# Patient Record
Sex: FEMALE | Race: WHITE | NOT HISPANIC OR LATINO | Employment: UNEMPLOYED | URBAN - METROPOLITAN AREA
[De-identification: names, ages, dates, MRNs, and addresses within clinical notes are randomized per-mention and may not be internally consistent; named-entity substitution may affect disease eponyms.]

---

## 2017-08-30 ENCOUNTER — APPOINTMENT (OUTPATIENT)
Dept: GENERAL RADIOLOGY | Facility: CLINIC | Age: 57
End: 2017-08-30
Attending: EMERGENCY MEDICINE
Payer: COMMERCIAL

## 2017-08-30 ENCOUNTER — HOSPITAL ENCOUNTER (EMERGENCY)
Facility: CLINIC | Age: 57
Discharge: HOME | End: 2017-08-30
Attending: EMERGENCY MEDICINE | Admitting: EMERGENCY MEDICINE
Payer: COMMERCIAL

## 2017-08-30 VITALS
SYSTOLIC BLOOD PRESSURE: 125 MMHG | HEIGHT: 63 IN | TEMPERATURE: 98.8 F | RESPIRATION RATE: 16 BRPM | DIASTOLIC BLOOD PRESSURE: 82 MMHG | HEART RATE: 83 BPM | OXYGEN SATURATION: 92 % | BODY MASS INDEX: 24.45 KG/M2 | WEIGHT: 138 LBS
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
## 2017-08-30 DIAGNOSIS — S99.911A ANKLE INJURY, RIGHT, INITIAL ENCOUNTER: ICD-10-CM

## 2017-08-30 PROCEDURE — 25000128 H RX IP 250 OP 636: Performed by: EMERGENCY MEDICINE

## 2017-08-30 PROCEDURE — 73630 X-RAY EXAM OF FOOT: CPT | Mod: RT

## 2017-08-30 PROCEDURE — 73610 X-RAY EXAM OF ANKLE: CPT | Mod: RT

## 2017-08-30 PROCEDURE — 99284 EMERGENCY DEPT VISIT MOD MDM: CPT

## 2017-08-30 PROCEDURE — 73590 X-RAY EXAM OF LOWER LEG: CPT | Mod: RT

## 2017-08-30 PROCEDURE — 99284 EMERGENCY DEPT VISIT MOD MDM: CPT | Mod: Z6 | Performed by: EMERGENCY MEDICINE

## 2017-08-30 RX ORDER — ONDANSETRON 2 MG/ML
4 INJECTION INTRAMUSCULAR; INTRAVENOUS ONCE
Status: COMPLETED | OUTPATIENT
Start: 2017-08-30 | End: 2017-08-30

## 2017-08-30 RX ORDER — TRAMADOL HYDROCHLORIDE 50 MG/1
25 TABLET ORAL EVERY 8 HOURS PRN
Qty: 6 TABLET | Refills: 0 | Status: SHIPPED | OUTPATIENT
Start: 2017-08-30 | End: 2017-12-28

## 2017-08-30 RX ADMIN — ONDANSETRON 4 MG: 2 INJECTION INTRAMUSCULAR; INTRAVENOUS at 21:52

## 2017-08-30 ASSESSMENT — ENCOUNTER SYMPTOMS
NUMBNESS: 0
WEAKNESS: 0
HEADACHES: 0

## 2017-08-30 NOTE — ED AVS SNAPSHOT
[facility], Emergency Department    [address] [facility] [address]    Phone:  [phone]                                       Akanksha Eric   MRN: [id number]    Department:  [facility], Emergency Department   Date of Visit:  8/30/2017           Patient Information     Date Of Birth          1960        Your diagnoses for this visit were:     Ankle injury, right, initial encounter        You were seen by Fely Liang MD.        Discharge Instructions       FOLLOW-UP:  Please make an appointment to follow up with:  - Your Primary Care Provider within a week  - Orthopedics (phone: [phone])     PRESCRIPTIONS / MEDICATIONS:  - You can use Ibuprofen (600mg up to every 6-8 hours) and/or Tylenol/acetaminophen (1000mg up to every 8 hours) as needed for pain/symptom control.   - For pain that is not controlled by the above medications, you can try the prescribed tramadol.  --- You have been prescribed narcotic pain medication. You should not take this medication and use alcohol or other sedating substances or medications. Do not drive, operate heavy machinery, or engage in potentially dangerous activities while on this medication. This medication can also cause constipation and other adverse reactions. If you develop abnormal symptoms stop taking this medication and contact your medical provider.     OTHER INSTRUCTIONS:  - Use the provided crutches and boot to protect your foot and ankle. Try to not bear weight on this ankle while it is this severely injured, but do remove it from the boot to try to move it around as much as you can tolerate pain-wise.   --- An example of this is you could pretend you're writing the alphabet with your foot, as we discussed.     RETURN TO THE EMERGENCY DEPARTMENT  Return to the Emergency Department at any time for new/worsening symptoms.       Understanding Ankle Sprain    The ankle is the joint where the leg and foot meet. Bones are held in place by  connective tissue called ligaments. When ankle ligaments are stretched to the point of pain and injury, it is called an ankle sprain. A sprain can tear the ligaments. These tears can be very small but still cause pain. Ankle sprains can be mild or severe.  What causes an ankle sprain?  A sprain may occur when you twist your ankle or bend it too far. This can happen when you stumble or fall. Things that can make an ankle sprain more likely include:    Having had an ankle sprain before    Playing sports that involve running and jumping. Or playing contact sports such as football or hockey.    Wearing shoes that don t support your feet and ankles well    Having ankles with poor strength and flexibility  Symptoms of an ankle sprain  Symptoms may include:    Pain or soreness in the ankle    Swelling    Redness or bruising    Not being able to walk or put weight on the affected foot    Reduced range of motion in the ankle    A popping or tearing feeling at the time the sprain occurs    An abnormal or dislocated look to the ankle    Instability or too much range of motion in the ankle  Treatment for an ankle sprain  Treatment focuses on reducing pain and swelling, and avoiding further injury. Treatments may include:    Resting the ankle. Avoid putting weight on it. This may mean using crutches until the sprain heals.    Prescription or over-the-counter pain medicines. These help reduce swelling and pain.    Cold packs. These help reduce pain and swelling.    Raising your ankle above your heart. This helps reduce swelling.    Wrapping the ankle with an elastic bandage or ankle brace. This helps reduce swelling and gives some support to the ankle. In rare cases, you may need a cast or boot.    Stretching and other exercises. These improve flexibility and strength.    Heat packs. These may be recommended before doing ankle exercises.  Possible complications of an ankle sprain  An ankle that has been weakened by a sprain can  be more likely to have repeated sprains afterward. Doing exercises to strengthen your ankle and improve balance can reduce your risk for repeated sprains. Other possible complications are long-term (chronic) pain or an ankle that remains unstable.  Additionally, there could be potential injuries that were not seen on your initial x-rays and this can be evaluated further in the outpatient clinic setting.  When to call your healthcare provider  Call your healthcare provider right away if you have any of these:    Fever of 100.4 F (38 C) or higher, or as directed    Pain, numbness, discoloration, or coldness in the foot or toes    Pain that gets worse    Symptoms that don t get better, or get worse    New symptoms   Date Last Reviewed: 3/10/2016    3810-7504 [employer]. [address] [place] [address], [place] [address]. All rights reserved. This information is not intended as a substitute for professional medical care. Always follow your healthcare professional's instructions.      Treating Ankle Sprains  Treatment will depend on how bad your sprain is. For a severe sprain, healing may take 3 months or more.  Right after your injury: Use R.I.C.E.    Rest: At first, keep weight off the ankle as much as you can. You may be given crutches to help you walk without putting weight on the ankle.    Ice: Put an ice pack on the ankle for 15 minutes. Remove the pack and wait at least 30 minutes. Repeat for up to 3 days. This helps reduce swelling.    Compression: To reduce swelling and keep the joint stable, you may need to wrap the ankle with an elastic bandage. For more severe sprains, you may need an ankle brace or a cast.    Elevation: To reduce swelling, keep your ankle raised above your heart when you sit or lie down.  Medicine  Your healthcare provider may suggest oral non-steroidal anti-inflammatory medicine (NSAIDs), such as ibuprofen. This relieves the pain and helps reduce any swelling. Be sure to take your  medicine as directed.    Date Last Reviewed: 9/28/2015 2000-2017 The [employer]. [address] [place] [address], [place] [address]. All rights reserved. This information is not intended as a substitute for professional medical care. Always follow your healthcare professional's instructions.              24 Hour Appointment Hotline       To make an appointment at any [facility], call [phone] ([phone]). If you don't have a family doctor or clinic, we will help you find one. [facility] are conveniently located to serve the needs of you and your family.          ED Discharge Orders     Shanell Rhoades           ORTHOPEDICS ADULT REFERRAL       Your provider has referred you to: [facility]: Orthopaedic Clinic [facility] [phone]   [email or web address]      Please be aware that coverage of these services is subject to the terms and limitations of your health insurance plan.  Call member services at your health plan with any benefit or coverage questions.      Please bring the following to your appointment:    >>   Any x-rays, CTs or MRIs which have been performed.  Contact the facility where they were done to arrange for  prior to your scheduled appointment.    >>   List of current medications   >>   This referral request   >>   Any documents/labs given to you for this referral                     Review of your medicines      START taking        Dose / Directions Last dose taken    traMADol 50 MG tablet   Commonly known as:  ULTRAM   Dose:  25 mg   Quantity:  6 tablet        Take 0.5 tablets (25 mg) by mouth every 8 hours as needed for severe pain or breakthrough pain   Refills:  0          Our records show that you are taking the medicines listed below. If these are incorrect, please call your family doctor or clinic.        Dose / Directions Last dose taken    EPINEPHrine 0.3 MG/0.3ML injection   Commonly known as:  EPIPEN 2-ALONZO   Dose:  0.3 mg    Quantity:  2 each        Inject 0.3 mLs into the muscle once as needed for anaphylaxis for 1 dose.   Refills:  3        OMEPRAZOLE        as needed.   Refills:  0                Prescriptions were sent or printed at these locations (1 Prescription)                   Other Prescriptions                Printed at Department/Unit printer (1 of 1)         traMADol (ULTRAM) 50 MG tablet                Procedures and tests performed during your visit     Ankle XR, G/E 3 views, right    Foot  XR, G/E 3 views, right    Tib/Fib XR, right      Orders Needing Specimen Collection     None      Pending Results     Date and Time Order Name Status Description    8/30/2017 2107 Tib/Fib XR, right Preliminary     8/30/2017 2107 Foot  XR, G/E 3 views, right Preliminary     8/30/2017 2107 Ankle XR, G/E 3 views, right Preliminary             Pending Culture Results     No orders found from 8/28/2017 to 8/31/2017.            Pending Results Instructions     If you had any lab results that were not finalized at the time of your Discharge, you can call the ED Lab Result RN at [phone]. You will be contacted by this team for any positive Lab results or changes in treatment. The nurses are available 7 days a week from 10A to 6:30P.  You can leave a message 24 hours per day and they will return your call.        Thank you for choosing [facility]       Thank you for choosing [facility] for your care. Our goal is always to provide you with excellent care. Hearing back from our patients is one way we can continue to improve our services. Please take a few minutes to complete the written survey that you may receive in the mail after you visit with us. Thank you!        [employer] Information     [employer] gives you secure access to your electronic health record. If you see a primary care provider, you can also send messages to your care team and make appointments. If you have questions, please call your primary care clinic.  If you do not have a  primary care provider, please call [phone] and they will assist you.        Care EveryWhere ID     This is your Care EveryWhere ID. This could be used by other organizations to access your [facility] medical records  [id number]        Equal Access to Services     YRN SINGH : Maria T Thurston, jas mcclendon, gisel kaalniall florez, malvin cadet. So [facility] [phone].    ATENCIÓN: Si habla español, tiene a chery disposición servicios gratuitos de asistencia lingüística. Llame al [phone].    We comply with applicable federal civil rights laws and [place] laws. We do not discriminate on the basis of race, color, national origin, age, disability sex, sexual orientation or gender identity.            After Visit Summary       This is your record. Keep this with you and show to your community pharmacist(s) and doctor(s) at your next visit.

## 2017-08-30 NOTE — ED AVS SNAPSHOT
[facility] [place], Emergency Department    [address] [place] [id number]    Phone:  [phone]                                       Akanksha Eric   MRN: [id number]    Department:  [facility], Emergency Department   Date of Visit:  8/30/2017           After Visit Summary Signature Page     I have received my discharge instructions, and my questions have been answered. I have discussed any challenges I see with this plan with the nurse or doctor.    ..........................................................................................................................................  Patient/Patient Representative Signature      ..........................................................................................................................................  Patient Representative Print Name and Relationship to Patient    ..................................................               ................................................  Date                                            Time    ..........................................................................................................................................  Reviewed by Signature/Title    ...................................................              ..............................................  Date                                                            Time
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
## 2017-08-31 NOTE — DISCHARGE INSTRUCTIONS
FOLLOW-UP:  Please make an appointment to follow up with:  - Your Primary Care Provider within a week  - Orthopedics (phone: [phone])     PRESCRIPTIONS / MEDICATIONS:  - You can use Ibuprofen (600mg up to every 6-8 hours) and/or Tylenol/acetaminophen (1000mg up to every 8 hours) as needed for pain/symptom control.   - For pain that is not controlled by the above medications, you can try the prescribed tramadol.  --- You have been prescribed narcotic pain medication. You should not take this medication and use alcohol or other sedating substances or medications. Do not drive, operate heavy machinery, or engage in potentially dangerous activities while on this medication. This medication can also cause constipation and other adverse reactions. If you develop abnormal symptoms stop taking this medication and contact your medical provider.     OTHER INSTRUCTIONS:  - Use the provided crutches and boot to protect your foot and ankle. Try to not bear weight on this ankle while it is this severely injured, but do remove it from the boot to try to move it around as much as you can tolerate pain-wise.   --- An example of this is you could pretend you're writing the alphabet with your foot, as we discussed.     RETURN TO THE EMERGENCY DEPARTMENT  Return to the Emergency Department at any time for new/worsening symptoms.       Understanding Ankle Sprain    The ankle is the joint where the leg and foot meet. Bones are held in place by connective tissue called ligaments. When ankle ligaments are stretched to the point of pain and injury, it is called an ankle sprain. A sprain can tear the ligaments. These tears can be very small but still cause pain. Ankle sprains can be mild or severe.  What causes an ankle sprain?  A sprain may occur when you twist your ankle or bend it too far. This can happen when you stumble or fall. Things that can make an ankle sprain more likely include:    Having had an ankle sprain  before    Playing sports that involve running and jumping. Or playing contact sports such as football or hockey.    Wearing shoes that don t support your feet and ankles well    Having ankles with poor strength and flexibility  Symptoms of an ankle sprain  Symptoms may include:    Pain or soreness in the ankle    Swelling    Redness or bruising    Not being able to walk or put weight on the affected foot    Reduced range of motion in the ankle    A popping or tearing feeling at the time the sprain occurs    An abnormal or dislocated look to the ankle    Instability or too much range of motion in the ankle  Treatment for an ankle sprain  Treatment focuses on reducing pain and swelling, and avoiding further injury. Treatments may include:    Resting the ankle. Avoid putting weight on it. This may mean using crutches until the sprain heals.    Prescription or over-the-counter pain medicines. These help reduce swelling and pain.    Cold packs. These help reduce pain and swelling.    Raising your ankle above your heart. This helps reduce swelling.    Wrapping the ankle with an elastic bandage or ankle brace. This helps reduce swelling and gives some support to the ankle. In rare cases, you may need a cast or boot.    Stretching and other exercises. These improve flexibility and strength.    Heat packs. These may be recommended before doing ankle exercises.  Possible complications of an ankle sprain  An ankle that has been weakened by a sprain can be more likely to have repeated sprains afterward. Doing exercises to strengthen your ankle and improve balance can reduce your risk for repeated sprains. Other possible complications are long-term (chronic) pain or an ankle that remains unstable.  Additionally, there could be potential injuries that were not seen on your initial x-rays and this can be evaluated further in the outpatient clinic setting.  When to call your healthcare provider  Call your healthcare provider right  away if you have any of these:    Fever of 100.4 F (38 C) or higher, or as directed    Pain, numbness, discoloration, or coldness in the foot or toes    Pain that gets worse    Symptoms that don t get better, or get worse    New symptoms   Date Last Reviewed: 3/10/2016    5079-4581 [employer]. [address] [place] [address]. All rights reserved. This information is not intended as a substitute for professional medical care. Always follow your healthcare professional's instructions.      Treating Ankle Sprains  Treatment will depend on how bad your sprain is. For a severe sprain, healing may take 3 months or more.  Right after your injury: Use R.I.C.E.    Rest: At first, keep weight off the ankle as much as you can. You may be given crutches to help you walk without putting weight on the ankle.    Ice: Put an ice pack on the ankle for 15 minutes. Remove the pack and wait at least 30 minutes. Repeat for up to 3 days. This helps reduce swelling.    Compression: To reduce swelling and keep the joint stable, you may need to wrap the ankle with an elastic bandage. For more severe sprains, you may need an ankle brace or a cast.    Elevation: To reduce swelling, keep your ankle raised above your heart when you sit or lie down.  Medicine  Your healthcare provider may suggest oral non-steroidal anti-inflammatory medicine (NSAIDs), such as ibuprofen. This relieves the pain and helps reduce any swelling. Be sure to take your medicine as directed.    Date Last Reviewed: 9/28/2015 2000-2017 [employer]. [address] [place] [address]. All rights reserved. This information is not intended as a substitute for professional medical care. Always follow your healthcare professional's instructions.

## 2017-09-01 ASSESSMENT — ENCOUNTER SYMPTOMS
COLOR CHANGE: 1
NECK PAIN: 0
NAUSEA: 1
WOUND: 0
JOINT SWELLING: 1
BRUISES/BLEEDS EASILY: 0
SEIZURES: 0
BACK PAIN: 0
VOMITING: 0
ARTHRALGIAS: 1

## 2017-12-28 ENCOUNTER — OFFICE VISIT (OUTPATIENT)
Dept: FAMILY MEDICINE | Facility: CLINIC | Age: 57
End: 2017-12-28
Payer: COMMERCIAL

## 2017-12-28 VITALS
TEMPERATURE: 99.8 F | OXYGEN SATURATION: 97 % | WEIGHT: 147 LBS | RESPIRATION RATE: 18 BRPM | BODY MASS INDEX: 26.04 KG/M2 | SYSTOLIC BLOOD PRESSURE: 138 MMHG | DIASTOLIC BLOOD PRESSURE: 83 MMHG | HEART RATE: 73 BPM

## 2017-12-28 DIAGNOSIS — J20.9 ACUTE BRONCHITIS WITH SYMPTOMS > 10 DAYS: Primary | ICD-10-CM

## 2017-12-28 PROCEDURE — 99202 OFFICE O/P NEW SF 15 MIN: CPT | Performed by: FAMILY MEDICINE

## 2017-12-28 RX ORDER — AZITHROMYCIN 250 MG/1
TABLET, FILM COATED ORAL
Qty: 6 TABLET | Refills: 0 | Status: SHIPPED | OUTPATIENT
Start: 2017-12-28 | End: 2018-01-11

## 2017-12-28 NOTE — MR AVS SNAPSHOT
After Visit Summary   12/28/2017    Akanksha Eric    MRN: [id number]           Patient Information     Date Of Birth          1960        Visit Information        Provider Department      12/28/2017 2:00 PM Devan Lomas MD [facility]        Today's Diagnoses     Acute bronchitis with symptoms > 10 days    -  1       Follow-ups after your visit        Your next 10 appointments already scheduled     Dec 29, 2017 11:20 AM CST   Office Visit with Janine Carr MD   [facility] ([facility])    [address] [place] [id number] [phone]           Bring a current list of meds and any records pertaining to this visit. For Physicals, please bring immunization records and any forms needing to be filled out. Please arrive 10 minutes early to complete paperwork.              Who to contact     If you have questions or need follow up information about today's clinic visit or your schedule please contact [facility] directly at [phone].  Normal or non-critical lab and imaging results will be communicated to you by Hedvighart, letter or phone within 4 business days after the clinic has received the results. If you do not hear from us within 7 days, please contact the clinic through Hedvighart or phone. If you have a critical or abnormal lab result, we will notify you by phone as soon as possible.  Submit refill requests through CleanApp or call your pharmacy and they will forward the refill request to us. Please allow 3 business days for your refill to be completed.          Additional Information About Your Visit        MyChart Information     CleanApp gives you secure access to your electronic health record. If you see a primary care provider, you can also send messages to your care team and make appointments. If you have questions, please call your primary care clinic.  If you do not have a  primary care provider, please call [phone] and they will assist you.        Care EveryWhere ID     This is your Care EveryWhere ID. This could be used by other organizations to access your [facility] medical records  [id number]        Your Vitals Were     Pulse Temperature Respirations Last Period Pulse Oximetry BMI (Body Mass Index)    73 99.8  F (37.7  C) (Tympanic) 18 08/25/2003 97% 26.04 kg/m2       Blood Pressure from Last 3 Encounters:   12/28/17 138/83   08/30/17 125/82   07/24/15 129/85    Weight from Last 3 Encounters:   12/28/17 147 lb (66.7 kg)   08/30/17 138 lb (62.6 kg)   07/24/15 141 lb 3.2 oz (64 kg)              Today, you had the following     No orders found for display         Today's Medication Changes          These changes are accurate as of: 12/28/17  3:33 PM.  If you have any questions, ask your nurse or doctor.               Start taking these medicines.        Dose/Directions    azithromycin 250 MG tablet   Commonly known as:  ZITHROMAX   Started by:  Devan Lomas MD        2 TABLETS TODAY, THEN 1 TABLET DAILY X NEXT 4 DAYS   Quantity:  6 tablet   Refills:  0         Stop taking these medicines if you haven't already. Please contact your care team if you have questions.     traMADol 50 MG tablet   Commonly known as:  ULTRAM   Stopped by:  Devan Lomas MD                Where to get your medicines      These medications were sent to [facility] - [place], [place] - [address]  [address], [place] [address]     Phone:  [phone]     azithromycin 250 MG tablet                Primary Care Provider Fax #    Physician No Ref-Primary [phone]       No address on file        Equal Access to Services     [facility] : Maria T Thurston, jas lukim, qaybta kaalmada vikki, malvin cadet. So [facility] [phone].    ATENCIÓN: Si habla español, tiene a chery disposición servicios gratuitos de asistencia  lingüística. [place]ne al [phone].    We comply with applicable federal civil rights laws and [place] laws. We do not discriminate on the basis of race, color, national origin, age, disability, sex, sexual orientation, or gender identity.            Thank you!     Thank you for choosing [facility]  for your care. Our goal is always to provide you with excellent care. Hearing back from our patients is one way we can continue to improve our services. Please take a few minutes to complete the written survey that you may receive in the mail after your visit with us. Thank you!             Your Updated Medication List - Protect others around you: Learn how to safely use, store and throw away your medicines at www.disposemymeds.org.          This list is accurate as of: 12/28/17  3:33 PM.  Always use your most recent med list.                   Brand Name Dispense Instructions for use Diagnosis    azithromycin 250 MG tablet    ZITHROMAX    6 tablet    2 TABLETS TODAY, THEN 1 TABLET DAILY X NEXT 4 DAYS        EPINEPHrine 0.3 MG/0.3ML injection    EPIPEN 2-ALONZO    2 each    Inject 0.3 mLs into the muscle once as needed for anaphylaxis for 1 dose.        OMEPRAZOLE      as needed.

## 2017-12-28 NOTE — NURSING NOTE
"Chief Complaint   Patient presents with     URI     cough       Initial /83 (BP Location: Left arm, Patient Position: Sitting, Cuff Size: Adult Regular)  Pulse 73  Temp 99.8  F (37.7  C) (Tympanic)  Resp 18  Wt 147 lb (66.7 kg)  LMP 08/25/2003  SpO2 97%  BMI 26.04 kg/m2 Estimated body mass index is 26.04 kg/(m^2) as calculated from the following:    Height as of 8/30/17: 5' 3\" (1.6 m).    Weight as of this encounter: 147 lb (66.7 kg).  Medication Reconciliation: complete     Luis Miguel Bustos MA      "

## 2017-12-28 NOTE — PROGRESS NOTES
SUBJECTIVE:   Akanksha Eric is a 57 year old female who presents to clinic today for the following health issues:    RESPIRATORY SYMPTOMS      Duration: few weeks    Description  Cough with phlegm and chest is full. Ache in the shoulder and chest for coughing. Slight fever with body chills couple of days ago.    Severity: modern    History (predisposing factors):  none    Precipitating or alleviating factors: Pt is under a lot of stress.    Therapies tried and outcome:  Advil for pain in the shoulder and mid back    No shortness of breath. Not much for nasal congestion.     No recent medical care. Concerned about cancer given her brother and sister.     No particular exposure. No asthma.     OBJECTIVE: /83 (BP Location: Left arm, Patient Position: Sitting, Cuff Size: Adult Regular)  Pulse 73  Temp 99.8  F (37.7  C) (Tympanic)  Resp 18  Wt 147 lb (66.7 kg)  LMP 08/25/2003  SpO2 97%  BMI 26.04 kg/m2 Exam:  GENERAL APPEARANCE: healthy, alert and no distress  HENT: ear canals and TM's normal and nose and mouth without ulcers or lesions  NECK: no adenopathy, no asymmetry, masses, or scars and thyroid normal to palpation  RESP: lungs clear to auscultation - no rales, rhonchi or wheezes  CV: regular rates and rhythm, normal S1 S2, no S3 or S4 and no murmur, click or rub -      ICD-10-CM    1. Acute bronchitis with symptoms > 10 days J20.9     possibly bacterial given duration. Symptomatic therapy and follow up discussed

## 2018-01-11 ENCOUNTER — OFFICE VISIT (OUTPATIENT)
Dept: FAMILY MEDICINE | Facility: CLINIC | Age: 58
End: 2018-01-11
Payer: COMMERCIAL

## 2018-01-11 VITALS
WEIGHT: 147 LBS | HEART RATE: 68 BPM | DIASTOLIC BLOOD PRESSURE: 90 MMHG | OXYGEN SATURATION: 95 % | BODY MASS INDEX: 26.05 KG/M2 | RESPIRATION RATE: 16 BRPM | HEIGHT: 63 IN | SYSTOLIC BLOOD PRESSURE: 133 MMHG | TEMPERATURE: 97.9 F

## 2018-01-11 DIAGNOSIS — Z00.00 ROUTINE GENERAL MEDICAL EXAMINATION AT A HEALTH CARE FACILITY: Primary | ICD-10-CM

## 2018-01-11 DIAGNOSIS — Z12.31 ENCOUNTER FOR SCREENING MAMMOGRAM FOR BREAST CANCER: ICD-10-CM

## 2018-01-11 LAB
BASOPHILS # BLD AUTO: 0 10E9/L (ref 0–0.2)
BASOPHILS NFR BLD AUTO: 0.4 %
DIFFERENTIAL METHOD BLD: NORMAL
EOSINOPHIL # BLD AUTO: 0.2 10E9/L (ref 0–0.7)
EOSINOPHIL NFR BLD AUTO: 3.7 %
ERYTHROCYTE [DISTWIDTH] IN BLOOD BY AUTOMATED COUNT: 11.9 % (ref 10–15)
HCT VFR BLD AUTO: 42 % (ref 35–47)
HGB BLD-MCNC: 13.4 G/DL (ref 11.7–15.7)
LYMPHOCYTES # BLD AUTO: 1.9 10E9/L (ref 0.8–5.3)
LYMPHOCYTES NFR BLD AUTO: 35 %
MCH RBC QN AUTO: 31.5 PG (ref 26.5–33)
MCHC RBC AUTO-ENTMCNC: 31.9 G/DL (ref 31.5–36.5)
MCV RBC AUTO: 99 FL (ref 78–100)
MONOCYTES # BLD AUTO: 0.6 10E9/L (ref 0–1.3)
MONOCYTES NFR BLD AUTO: 12 %
NEUTROPHILS # BLD AUTO: 2.6 10E9/L (ref 1.6–8.3)
NEUTROPHILS NFR BLD AUTO: 48.9 %
PLATELET # BLD AUTO: 292 10E9/L (ref 150–450)
RBC # BLD AUTO: 4.26 10E12/L (ref 3.8–5.2)
WBC # BLD AUTO: 5.4 10E9/L (ref 4–11)

## 2018-01-11 PROCEDURE — 85025 COMPLETE CBC W/AUTO DIFF WBC: CPT | Performed by: FAMILY MEDICINE

## 2018-01-11 PROCEDURE — 36415 COLL VENOUS BLD VENIPUNCTURE: CPT | Performed by: FAMILY MEDICINE

## 2018-01-11 PROCEDURE — 80061 LIPID PANEL: CPT | Performed by: FAMILY MEDICINE

## 2018-01-11 PROCEDURE — 80053 COMPREHEN METABOLIC PANEL: CPT | Performed by: FAMILY MEDICINE

## 2018-01-11 PROCEDURE — 99396 PREV VISIT EST AGE 40-64: CPT | Performed by: FAMILY MEDICINE
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
## 2018-01-11 NOTE — MR AVS SNAPSHOT
After Visit Summary   1/11/2018    Akanksha Eric    MRN: [id number]           Patient Information     Date Of Birth          1960        Visit Information        Provider Department      1/11/2018 10:00 AM Janine Carr MD [facility]        Today's Diagnoses     Routine general medical examination at a health care facility    -  1    Encounter for screening mammogram for breast cancer          Care Instructions      Preventive Health Recommendations  Female Ages 50 - 64    Yearly exam: See your health care provider every year in order to  o Review health changes.   o Discuss preventive care.    o Review your medicines if your doctor has prescribed any.      Get a Pap test every three years (unless you have an abnormal result and your provider advises testing more often).    If you get Pap tests with HPV test, you only need to test every 5 years, unless you have an abnormal result.     You do not need a Pap test if your uterus was removed (hysterectomy) and you have not had cancer.    You should be tested each year for STDs (sexually transmitted diseases) if you're at risk.     Have a mammogram every 1 to 2 years.    Have a colonoscopy at age 50, or have a yearly FIT test (stool test). These exams screen for colon cancer.      Have a cholesterol test every 5 years, or more often if advised.    Have a diabetes test (fasting glucose) every three years. If you are at risk for diabetes, you should have this test more often.     If you are at risk for osteoporosis (brittle bone disease), think about having a bone density scan (DEXA).    Shots: Get a flu shot each year. Get a tetanus shot every 10 years.    Nutrition:     Eat at least 5 servings of fruits and vegetables each day.    Eat whole-grain bread, whole-wheat pasta and brown rice instead of white grains and rice.    Talk to your provider about Calcium and Vitamin D.     Lifestyle    Exercise at least 150  minutes a week (30 minutes a day, 5 days a week). This will help you control your weight and prevent disease.    Limit alcohol to one drink per day.    No smoking.     Wear sunscreen to prevent skin cancer.     See your dentist every six months for an exam and cleaning.    See your eye doctor every 1 to 2 years.            Follow-ups after your visit        Future tests that were ordered for you today     Open Future Orders        Priority Expected Expires Ordered    *MA Screening Digital Bilateral Routine  1/11/2019 1/11/2018            Who to contact     If you have questions or need follow up information about today's clinic visit or your schedule please contact [facility] directly at [phone].  Normal or non-critical lab and imaging results will be communicated to you by [employer], letter or phone within 4 business days after the clinic has received the results. If you do not hear from us within 7 days, please contact the clinic through [employer] or phone. If you have a critical or abnormal lab result, we will notify you by phone as soon as possible.  Submit refill requests through [employer] or call your pharmacy and they will forward the refill request to us. Please allow 3 business days for your refill to be completed.          Additional Information About Your Visit        [employer] Information     [employer] gives you secure access to your electronic health record. If you see a primary care provider, you can also send messages to your care team and make appointments. If you have questions, please call your primary care clinic.  If you do not have a primary care provider, please call [phone] and they will assist you.        Care EveryWhere ID     This is your Care EveryWhere ID. This could be used by other organizations to access your [place] medical records  [id number]        Your Vitals Were     Pulse Temperature Respirations Height Last Period Pulse Oximetry    68 97.9  F (36.6  C)  "(Tympanic) 16 5' 3\" (1.6 m) 08/25/2003 95%    Breastfeeding? BMI (Body Mass Index)                No 26.04 kg/m2           Blood Pressure from Last 3 Encounters:   01/11/18 133/90   12/28/17 138/83   08/30/17 125/82    Weight from Last 3 Encounters:   01/11/18 147 lb (66.7 kg)   12/28/17 147 lb (66.7 kg)   08/30/17 138 lb (62.6 kg)              We Performed the Following     CBC with platelets differential     Comprehensive metabolic panel     Lipid panel reflex to direct LDL Fasting        Primary Care Provider Fax #    Physician No Ref-Primary [phone]       No address on file        Equal Access to Services     YRN SINGH : Maria T Thurston, jas mcclendon, gisel florez, malvin broderick . So [facility] [phone].    ATENCIÓN: Si habla español, tiene a chery disposición servicios gratuitos de asistencia lingüística. Llame al [phone].    We comply with applicable federal civil rights laws and [place] laws. We do not discriminate on the basis of race, color, national origin, age, disability, sex, sexual orientation, or gender identity.            Thank you!     Thank you for choosing [facility]  for your care. Our goal is always to provide you with excellent care. Hearing back from our patients is one way we can continue to improve our services. Please take a few minutes to complete the written survey that you may receive in the mail after your visit with us. Thank you!             Your Updated Medication List - Protect others around you: Learn how to safely use, store and throw away your medicines at www.disposemymeds.org.          This list is accurate as of: 1/11/18 10:52 AM.  Always use your most recent med list.                   Brand Name Dispense Instructions for use Diagnosis    EPINEPHrine 0.3 MG/0.3ML injection    EPIPEN 2-ALONZO    2 each    Inject 0.3 mLs into the muscle once as needed for anaphylaxis for 1 dose.        " OMEPRAZOLE      as needed.

## 2018-01-12 LAB
ALBUMIN SERPL-MCNC: 4.2 G/DL (ref 3.4–5)
ALP SERPL-CCNC: 85 U/L (ref 40–150)
ALT SERPL W P-5'-P-CCNC: 21 U/L (ref 0–50)
ANION GAP SERPL CALCULATED.3IONS-SCNC: 10 MMOL/L (ref 3–14)
AST SERPL W P-5'-P-CCNC: 19 U/L (ref 0–45)
BILIRUB SERPL-MCNC: 0.6 MG/DL (ref 0.2–1.3)
BUN SERPL-MCNC: 10 MG/DL (ref 7–30)
CALCIUM SERPL-MCNC: 9.6 MG/DL (ref 8.5–10.1)
CHLORIDE SERPL-SCNC: 109 MMOL/L (ref 94–109)
CHOLEST SERPL-MCNC: 254 MG/DL
CO2 SERPL-SCNC: 25 MMOL/L (ref 20–32)
CREAT SERPL-MCNC: 0.68 MG/DL (ref 0.52–1.04)
GFR SERPL CREATININE-BSD FRML MDRD: 88 ML/MIN/1.7M2
GLUCOSE SERPL-MCNC: 104 MG/DL (ref 70–99)
HDLC SERPL-MCNC: 115 MG/DL
LDLC SERPL CALC-MCNC: 125 MG/DL
NONHDLC SERPL-MCNC: 139 MG/DL
POTASSIUM SERPL-SCNC: 5.1 MMOL/L (ref 3.4–5.3)
PROT SERPL-MCNC: 7.7 G/DL (ref 6.8–8.8)
SODIUM SERPL-SCNC: 144 MMOL/L (ref 133–144)
TRIGL SERPL-MCNC: 68 MG/DL

## 2018-01-18 PROBLEM — R73.01 IMPAIRED FASTING GLUCOSE: Status: ACTIVE | Noted: 2018-01-18

## 2018-07-10 ENCOUNTER — TELEPHONE (OUTPATIENT)
Dept: FAMILY MEDICINE | Facility: CLINIC | Age: 58
End: 2018-07-10

## 2018-07-10 NOTE — TELEPHONE ENCOUNTER
7/10/2018    Attempt 1    Contacted patient in regards to scheduling VIP mammogram  Message on voicemail     Patient is also due for - Preventive Health Screening Colonoscopy    Comments:       Outreach   Phuong CARPENTER

## 2018-07-14 NOTE — TELEPHONE ENCOUNTER
7/14/2018    Attempt 2    Contacted patient in regards to scheduling VIP mammogram  Message on voicemail     Patient is also due for - Preventive Health Screening Colonoscopy    Comments:       Outreach   ka

## 2018-07-18 NOTE — TELEPHONE ENCOUNTER
7/18/2018    Attempt 3    Contacted patient in regards to scheduling VIP mammogram  Message on voicemail     Patient is also due for - Preventive Health Screening Colonoscopy    Comments:       Outreach   Yadira Tanner

## 2018-09-29 ENCOUNTER — HEALTH MAINTENANCE LETTER (OUTPATIENT)
Age: 58
End: 2018-09-29

## 2019-04-10 ENCOUNTER — OFFICE VISIT (OUTPATIENT)
Dept: FAMILY MEDICINE | Facility: CLINIC | Age: 59
End: 2019-04-10
Payer: COMMERCIAL

## 2019-04-10 VITALS
DIASTOLIC BLOOD PRESSURE: 70 MMHG | OXYGEN SATURATION: 97 % | RESPIRATION RATE: 12 BRPM | TEMPERATURE: 96.9 F | WEIGHT: 150 LBS | HEIGHT: 63 IN | SYSTOLIC BLOOD PRESSURE: 120 MMHG | BODY MASS INDEX: 26.58 KG/M2 | HEART RATE: 63 BPM

## 2019-04-10 DIAGNOSIS — R30.0 DYSURIA: ICD-10-CM

## 2019-04-10 DIAGNOSIS — J01.00 ACUTE NON-RECURRENT MAXILLARY SINUSITIS: Primary | ICD-10-CM

## 2019-04-10 LAB
ALBUMIN UR-MCNC: NEGATIVE MG/DL
APPEARANCE UR: CLEAR
BILIRUB UR QL STRIP: NEGATIVE
COLOR UR AUTO: YELLOW
GLUCOSE UR STRIP-MCNC: NEGATIVE MG/DL
HGB UR QL STRIP: NEGATIVE
KETONES UR STRIP-MCNC: NEGATIVE MG/DL
LEUKOCYTE ESTERASE UR QL STRIP: ABNORMAL
NITRATE UR QL: NEGATIVE
NON-SQ EPI CELLS #/AREA URNS LPF: NORMAL /LPF
PH UR STRIP: 7.5 PH (ref 5–7)
RBC #/AREA URNS AUTO: NORMAL /HPF
SOURCE: ABNORMAL
SP GR UR STRIP: 1.01 (ref 1–1.03)
UROBILINOGEN UR STRIP-ACNC: 0.2 EU/DL (ref 0.2–1)
WBC #/AREA URNS AUTO: NORMAL /HPF

## 2019-04-10 PROCEDURE — 81001 URINALYSIS AUTO W/SCOPE: CPT | Performed by: PHYSICIAN ASSISTANT

## 2019-04-10 PROCEDURE — 99214 OFFICE O/P EST MOD 30 MIN: CPT | Performed by: PHYSICIAN ASSISTANT

## 2019-04-10 ASSESSMENT — PATIENT HEALTH QUESTIONNAIRE - PHQ9
SUM OF ALL RESPONSES TO PHQ QUESTIONS 1-9: 5
5. POOR APPETITE OR OVEREATING: NOT AT ALL

## 2019-04-10 ASSESSMENT — ANXIETY QUESTIONNAIRES
7. FEELING AFRAID AS IF SOMETHING AWFUL MIGHT HAPPEN: SEVERAL DAYS
1. FEELING NERVOUS, ANXIOUS, OR ON EDGE: SEVERAL DAYS
5. BEING SO RESTLESS THAT IT IS HARD TO SIT STILL: SEVERAL DAYS
6. BECOMING EASILY ANNOYED OR IRRITABLE: SEVERAL DAYS
3. WORRYING TOO MUCH ABOUT DIFFERENT THINGS: SEVERAL DAYS

## 2019-04-10 ASSESSMENT — MIFFLIN-ST. JEOR: SCORE: 1219.53

## 2019-04-10 NOTE — NURSING NOTE
"Chief Complaint   Patient presents with     URI     UTI     /70   Pulse 63   Temp 96.9  F (36.1  C) (Tympanic)   Resp 12   Ht 1.6 m (5' 3\")   Wt 68 kg (150 lb)   LMP 08/25/2003 (LMP Unknown)   SpO2 97%   Breastfeeding? No   BMI 26.57 kg/m   Estimated body mass index is 26.57 kg/m  as calculated from the following:    Height as of this encounter: 1.6 m (5' 3\").    Weight as of this encounter: 68 kg (150 lb).  BP completed using cuff size: shefali Hurtado CMA    Health Maintenance Due   Topic Date Due     HIV SCREEN (SYSTEM ASSIGNED)  04/14/1976     HEPATITIS C SCREENING  04/14/1976     DTAP/TDAP/TD IMMUNIZATION (1 - Tdap) 04/14/1983     COLON CANCER SCREEN (SYSTEM ASSIGNED)  04/14/2008     ZOSTER IMMUNIZATION (1 of 2) 04/14/2008     ADVANCE DIRECTIVE PLANNING Q5 YRS  04/14/2013     MAMMO SCREEN Q2 YR (SYSTEM ASSIGNED)  11/28/2014     INFLUENZA VACCINE (1) 09/01/2018     PREVENTIVE CARE VISIT  01/11/2019     LIPID MONITORING Q1 YEAR  01/11/2019     PHQ-2 Q1 YR  01/11/2019     Health Maintenance reviewed at today's visit patient asked to schedule/complete:   Breast Cancer:  Patient agrees to schedule  Colon Cancer:  Patient agrees to schedule  Immunizations:  Patient agrees to schedule    "

## 2019-08-12 ENCOUNTER — TELEPHONE (OUTPATIENT)
Dept: FAMILY MEDICINE | Facility: CLINIC | Age: 59
End: 2019-08-12

## 2019-08-12 NOTE — LETTER
August 12, 2019    Akanksha Eric  [address]  [facility] [address]    Dear Jeoy Vale cares about your health and your health plan.  I have reviewed your medical conditions, medication list and lab results, and am making recommendations based on this review to better manage your health.    You are in particular need of attention regarding:  -Breast Cancer Screening  -Colon Cancer Screening  -Wellness (Physical) Visit     I am recommending that you:     -schedule a WELLNESS (Physical) APPOINTMENT with me.   I will check fasting labs the same day - nothing to eat except water and meds for 8-10 hours prior. (If you go elsewhere for Wellness visits then please disregard this reminder.)    -schedule a MAMMOGRAM which is due.    1 in 8 women will develop invasive breast cancer during her lifetime and it is the most common non-skin cancer in American women.  EARLY detection, new treatments, and a better understanding of the disease have increased survival rates - the 5 year survival rate in the 1960s was 63% and today it is close to 90%.    If you are under/uninsured, we recommend you contact the [facility]. They offer mammograms at no charge or on a sliding fee charge. You can schedule with them at [phone]. Please have them send us the results.      Please disregard this reminder if you have had this exam elsewhere within the last year.  It would be helpful for us to have a copy of your mammogram report in your file so that we can best coordinate your care - please contact us with when your test was done so we can update your record.             -schedule a COLONOSCOPY to look for colon cancer (due every 10 years or 5 years in higher risk situations.)        Colon cancer is now the second leading cause of cancer-related deaths in the [place] for both men and women and there are over 130,000 new cases and 50,000 deaths per year from colon cancer.  Colonoscopies can prevent 90-95% of these  deaths.  Problem lesions can be removed before they ever become cancer.  This test is not only looking for cancer, but also getting rid of precancerious lesions.    If you are under/uninsured, we recommend you contact the [facility] program. [facility] is a free colorectal cancer screening program that provides colonoscopies for eligible under/uninsured [place] men and women. If you are interested in receiving a free colonoscopy, please call [facility] at [phone] (mention code [employer]) to see if you re eligible.      If you do not wish to do a colonoscopy or cannot afford to do one, at this time, there is another option. It is called a FIT test or Fecal Immunochemical Occult Blood Test (take home stool sample kit).  It does not replace the colonoscopy for colorectal cancer screening, but it can detect hidden bleeding in the lower colon.  It does need to be repeated every year and if a positive result is obtained, you would be referred for a colonoscopy.          If you have completed either one of these tests at another facility, please call with the details of when and where the tests were done and if they were normal or not. Or have the records sent to our clinic so that we can best coordinate your care.      Please call us at the [place] location:  [phone] or use nCino to address the above recommendations.     Thank you for trusting [facility].  We appreciate the opportunity to serve you and look forward to supporting your healthcare in the future.    If you have (or plan to have) any of these tests done at a facility other than a [facility] or a [facility], please have the results sent to the [facility] location noted above.      Best Regards,    SOHAN Cunningham

## 2019-08-12 NOTE — TELEPHONE ENCOUNTER
Panel Management Review      Patient has the following on her problem list: None      Composite cancer screening  Chart review shows that this patient is due/due soon for the following Mammogram and Colonoscopy  Summary:    Patient is due/failing the following:   COLONOSCOPY, MAMMOGRAM and PHYSICAL    Action needed:   Patient needs office visit for physical. and Patient needs referral/order: mammo and colonoscopy    Type of outreach:    Sent letter.    Questions for provider review:    None

## 2019-10-02 ENCOUNTER — HEALTH MAINTENANCE LETTER (OUTPATIENT)
Age: 59
End: 2019-10-02

## 2021-01-15 ENCOUNTER — HEALTH MAINTENANCE LETTER (OUTPATIENT)
Age: 61
End: 2021-01-15

## 2021-09-04 ENCOUNTER — HEALTH MAINTENANCE LETTER (OUTPATIENT)
Age: 61
End: 2021-09-04

## 2021-10-30 ENCOUNTER — HEALTH MAINTENANCE LETTER (OUTPATIENT)
Age: 61
End: 2021-10-30

## 2022-02-19 ENCOUNTER — HEALTH MAINTENANCE LETTER (OUTPATIENT)
Age: 62
End: 2022-02-19

## 2022-07-24 ENCOUNTER — OFFICE VISIT (OUTPATIENT)
Dept: URGENT CARE | Facility: URGENT CARE | Age: 62
End: 2022-07-24
Payer: COMMERCIAL

## 2022-07-24 VITALS
HEART RATE: 75 BPM | WEIGHT: 135 LBS | BODY MASS INDEX: 23.91 KG/M2 | TEMPERATURE: 98.1 F | SYSTOLIC BLOOD PRESSURE: 144 MMHG | DIASTOLIC BLOOD PRESSURE: 82 MMHG | OXYGEN SATURATION: 99 % | RESPIRATION RATE: 20 BRPM

## 2022-07-24 DIAGNOSIS — L01.00 IMPETIGO: ICD-10-CM

## 2022-07-24 DIAGNOSIS — R07.0 THROAT PAIN: Primary | ICD-10-CM

## 2022-07-24 DIAGNOSIS — J01.00 ACUTE NON-RECURRENT MAXILLARY SINUSITIS: ICD-10-CM

## 2022-07-24 LAB
DEPRECATED S PYO AG THROAT QL EIA: NEGATIVE
GROUP A STREP BY PCR: NOT DETECTED
SARS-COV-2 RNA RESP QL NAA+PROBE: NEGATIVE

## 2022-07-24 PROCEDURE — 99204 OFFICE O/P NEW MOD 45 MIN: CPT | Performed by: FAMILY MEDICINE

## 2022-07-24 PROCEDURE — 87651 STREP A DNA AMP PROBE: CPT | Performed by: FAMILY MEDICINE

## 2022-07-24 PROCEDURE — U0003 INFECTIOUS AGENT DETECTION BY NUCLEIC ACID (DNA OR RNA); SEVERE ACUTE RESPIRATORY SYNDROME CORONAVIRUS 2 (SARS-COV-2) (CORONAVIRUS DISEASE [COVID-19]), AMPLIFIED PROBE TECHNIQUE, MAKING USE OF HIGH THROUGHPUT TECHNOLOGIES AS DESCRIBED BY CMS-2020-01-R: HCPCS | Performed by: FAMILY MEDICINE

## 2022-07-24 PROCEDURE — U0005 INFEC AGEN DETEC AMPLI PROBE: HCPCS | Performed by: FAMILY MEDICINE

## 2022-07-24 RX ORDER — MUPIROCIN 20 MG/G
OINTMENT TOPICAL 3 TIMES DAILY
Qty: 22 G | Refills: 0 | Status: SHIPPED | OUTPATIENT
Start: 2022-07-24

## 2022-07-24 RX ORDER — FLUTICASONE PROPIONATE 50 MCG
1-2 SPRAY, SUSPENSION (ML) NASAL DAILY
Qty: 16 G | Refills: 0 | Status: SHIPPED | OUTPATIENT
Start: 2022-07-24
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
## 2022-10-22 ENCOUNTER — HEALTH MAINTENANCE LETTER (OUTPATIENT)
Age: 62
End: 2022-10-22

## 2023-04-01 ENCOUNTER — HEALTH MAINTENANCE LETTER (OUTPATIENT)
Age: 63
End: 2023-04-01

## 2023-06-01 ENCOUNTER — HEALTH MAINTENANCE LETTER (OUTPATIENT)
Age: 63
End: 2023-06-01

## 2023-11-05 ENCOUNTER — HEALTH MAINTENANCE LETTER (OUTPATIENT)
Age: 63
End: 2023-11-05

## 2024-06-02 ENCOUNTER — HEALTH MAINTENANCE LETTER (OUTPATIENT)
Age: 64
End: 2024-06-02

## 2025-06-14 ENCOUNTER — HEALTH MAINTENANCE LETTER (OUTPATIENT)
Age: 65
End: 2025-06-14